# Patient Record
Sex: FEMALE | Race: WHITE | NOT HISPANIC OR LATINO | Employment: OTHER | URBAN - METROPOLITAN AREA
[De-identification: names, ages, dates, MRNs, and addresses within clinical notes are randomized per-mention and may not be internally consistent; named-entity substitution may affect disease eponyms.]

---

## 2019-07-27 ENCOUNTER — HOSPITAL ENCOUNTER (EMERGENCY)
Facility: HOSPITAL | Age: 39
Discharge: HOME | End: 2019-07-27
Attending: EMERGENCY MEDICINE
Payer: MEDICAID

## 2019-07-27 VITALS
HEIGHT: 69 IN | BODY MASS INDEX: 22.96 KG/M2 | OXYGEN SATURATION: 99 % | DIASTOLIC BLOOD PRESSURE: 70 MMHG | SYSTOLIC BLOOD PRESSURE: 126 MMHG | WEIGHT: 155 LBS | RESPIRATION RATE: 16 BRPM | TEMPERATURE: 98 F | HEART RATE: 77 BPM

## 2019-07-27 DIAGNOSIS — H57.89 EYE IRRITATION: Primary | ICD-10-CM

## 2019-07-27 PROCEDURE — 99283 EMERGENCY DEPT VISIT LOW MDM: CPT | Mod: ,,, | Performed by: EMERGENCY MEDICINE

## 2019-07-27 PROCEDURE — 99283 EMERGENCY DEPT VISIT LOW MDM: CPT

## 2019-07-27 PROCEDURE — 25000003 PHARM REV CODE 250: Performed by: EMERGENCY MEDICINE

## 2019-07-27 PROCEDURE — 99283 PR EMERGENCY DEPT VISIT,LEVEL III: ICD-10-PCS | Mod: ,,, | Performed by: EMERGENCY MEDICINE

## 2019-07-27 RX ORDER — CITALOPRAM 40 MG/1
40 TABLET, FILM COATED ORAL DAILY
COMMUNITY
End: 2021-06-02 | Stop reason: CLARIF

## 2019-07-27 RX ORDER — PROPARACAINE HYDROCHLORIDE 5 MG/ML
2 SOLUTION/ DROPS OPHTHALMIC
Status: COMPLETED | OUTPATIENT
Start: 2019-07-27 | End: 2019-07-27

## 2019-07-27 RX ORDER — ERYTHROMYCIN 5 MG/G
OINTMENT OPHTHALMIC
Status: COMPLETED | OUTPATIENT
Start: 2019-07-27 | End: 2019-07-27

## 2019-07-27 RX ADMIN — FLUORESCEIN SODIUM 1 EACH: 1 STRIP OPHTHALMIC at 02:07

## 2019-07-27 RX ADMIN — ERYTHROMYCIN 1 INCH: 5 OINTMENT OPHTHALMIC at 03:07

## 2019-07-27 RX ADMIN — PROPARACAINE HYDROCHLORIDE 2 DROP: 5 SOLUTION/ DROPS OPHTHALMIC at 02:07

## 2019-07-27 NOTE — ED TRIAGE NOTES
"Suzette Fagan, a 39 y.o. female presents to the ED w/ complaint of eye irritation to left eye after foreign object was in eye. PT stated she has blurry vision in left eye and feels like something is still in the eye after flushing it. PT denies chest pain, SOB, fever, chills, and n/v/d.     Triage note:  Chief Complaint   Patient presents with    Eye Problem     Pt states "It feels like something is in my eye, when I blink it rubs". Pt reports left eye irritation X1 day, tried to flush the eye with no relief. -vision change.      Review of patient's allergies indicates:  No Known Allergies  Past Medical History:   Diagnosis Date    Anxiety     Depression      Adult Physical Assessment  LOC: Suzette Fagan, 39 y.o. female verified via two identifiers.  The patient is awake, alert, oriented and speaking appropriately at this time.  APPEARANCE: Patient resting comfortably and appears to be in no acute distress at this time. Patient is clean and well groomed, patient's clothing is properly fastened.  SKIN:The skin is warm and dry, color consistent with ethnicity, patient has normal skin turgor and moist mucus membranes, skin intact, no breakdown or brusing noted.  MUSCULOSKELETAL: Patient moving all extremities well, no obvious swelling or deformities noted.  RESPIRATORY: Airway is open and patent, respirations are spontaneous, patient has a normal effort and rate, no accessory muscle use noted.  CARDIAC: Patient has a normal rate and rhythm, no periphreal edema noted in any extremity, capillary refill < 3 seconds in all extremities  ABDOMEN: Soft and non tender to palpation, no abdominal distention noted. Bowel sounds present in all four quadrants.  NEUROLOGIC: Eyes open spontaneously, behavior appropriate to situation, follows commands, facial expression symmetrical, bilateral hand grasp equal and even, purposeful motor response noted, normal sensation in all extremities when touched with a finger.    "

## 2019-07-27 NOTE — DISCHARGE INSTRUCTIONS
Use ointment 2-3 times per day to keep eye moist. Can also use artificial tears   Follow up with eye doctor on Monday if symptoms are not resolved

## 2019-07-27 NOTE — ED PROVIDER NOTES
"Encounter Date: 7/27/2019    SCRIBE #1 NOTE: I, Paulo Davidson, am scribing for, and in the presence of,  Oma Zapata MD. I have scribed the following portions of the note - Other sections scribed: HPI, ROS, PE, MDM.       History     Chief Complaint   Patient presents with    Eye Problem     Pt states "It feels like something is in my eye, when I blink it rubs". Pt reports left eye irritation X1 day, tried to flush the eye with no relief. -vision change.      Time seen by provider: 2:38 AM    This is a 39 y.o. female who presents with complaint of left eye foreign body sensation. Patient states she was in the trunk of her car at 11 AM yesterday when she suddenly felt some eye irritation. Patient has tried flushing out her eye with water but continues to have foreign body sensation in her eye. Patient wears glasses only and does not use contact lenses. She feels that her vision is decreased in the left eye.         Review of patient's allergies indicates:  No Known Allergies  Past Medical History:   Diagnosis Date    Anxiety     Depression      History reviewed. No pertinent surgical history.  History reviewed. No pertinent family history.  Social History     Tobacco Use    Smoking status: Current Every Day Smoker     Packs/day: 1.00     Years: 20.00     Pack years: 20.00    Smokeless tobacco: Never Used   Substance Use Topics    Alcohol use: Not Currently    Drug use: Never     Review of Systems   Constitutional: Negative for fatigue and fever.   Eyes: Positive for pain and visual disturbance.   All other systems reviewed and are negative.      Physical Exam     Initial Vitals [07/27/19 0107]   BP Pulse Resp Temp SpO2   126/70 77 16 97.6 °F (36.4 °C) 99 %      MAP       --         Physical Exam    Nursing note and vitals reviewed.  Constitutional: She appears well-developed and well-nourished. No distress.   HENT:   Head: Normocephalic and atraumatic.   Eyes: EOM are normal. Pupils are equal, round, and " reactive to light.   No foreign body. Minimal scleral injection. No fluorescein uptake.    Cardiovascular: Normal rate.   Pulmonary/Chest: No respiratory distress.   Musculoskeletal: Normal range of motion.   Neurological: She is oriented to person, place, and time. GCS score is 15. GCS eye subscore is 4. GCS verbal subscore is 5. GCS motor subscore is 6.         ED Course   Procedures  Labs Reviewed - No data to display       Imaging Results    None          Medical Decision Making:   History:   Old Medical Records: I decided to obtain old medical records.  Initial Assessment:   Evaluation of left eye pain, consistent with foreign body sensation. No obvious foreign body seen. Relief with proparacaine drop. No fluorescein uptake noted. Will treat with erythromycin for comfort. Follow up with eye doctor on Monday if symptoms have not improved.             Scribe Attestation:   Scribe #1: I performed the above scribed service and the documentation accurately describes the services I performed. I attest to the accuracy of the note.               Clinical Impression:       ICD-10-CM ICD-9-CM   1. Eye irritation H57.89 379.99                                Oma Zapata MD  07/27/19 0626

## 2021-03-05 ENCOUNTER — HOSPITAL ENCOUNTER (EMERGENCY)
Facility: HOSPITAL | Age: 41
Discharge: HOME | End: 2021-03-06
Attending: EMERGENCY MEDICINE
Payer: MEDICAID

## 2021-03-05 VITALS
SYSTOLIC BLOOD PRESSURE: 131 MMHG | TEMPERATURE: 98 F | BODY MASS INDEX: 27.85 KG/M2 | HEART RATE: 83 BPM | OXYGEN SATURATION: 97 % | DIASTOLIC BLOOD PRESSURE: 68 MMHG | HEIGHT: 69 IN | WEIGHT: 188 LBS | RESPIRATION RATE: 16 BRPM

## 2021-03-05 DIAGNOSIS — M25.571 ACUTE RIGHT ANKLE PAIN: ICD-10-CM

## 2021-03-05 DIAGNOSIS — M79.661 PAIN AND SWELLING OF RIGHT LOWER LEG: ICD-10-CM

## 2021-03-05 DIAGNOSIS — M79.89 PAIN AND SWELLING OF RIGHT LOWER LEG: ICD-10-CM

## 2021-03-05 LAB
ALBUMIN SERPL BCP-MCNC: 4.1 G/DL (ref 3.5–5.2)
ALP SERPL-CCNC: 99 U/L (ref 55–135)
ALT SERPL W/O P-5'-P-CCNC: 15 U/L (ref 10–44)
ANION GAP SERPL CALC-SCNC: 10 MMOL/L (ref 8–16)
AST SERPL-CCNC: 18 U/L (ref 10–40)
B-HCG UR QL: NEGATIVE
BASOPHILS # BLD AUTO: 0.02 K/UL (ref 0–0.2)
BASOPHILS NFR BLD: 0.3 % (ref 0–1.9)
BILIRUB SERPL-MCNC: 0.3 MG/DL (ref 0.1–1)
BUN SERPL-MCNC: 10 MG/DL (ref 6–20)
CALCIUM SERPL-MCNC: 8.8 MG/DL (ref 8.7–10.5)
CHLORIDE SERPL-SCNC: 109 MMOL/L (ref 95–110)
CO2 SERPL-SCNC: 25 MMOL/L (ref 23–29)
CREAT SERPL-MCNC: 0.9 MG/DL (ref 0.5–1.4)
CTP QC/QA: YES
DIFFERENTIAL METHOD: ABNORMAL
EOSINOPHIL # BLD AUTO: 0.2 K/UL (ref 0–0.5)
EOSINOPHIL NFR BLD: 2.8 % (ref 0–8)
ERYTHROCYTE [DISTWIDTH] IN BLOOD BY AUTOMATED COUNT: 12.8 % (ref 11.5–14.5)
EST. GFR  (AFRICAN AMERICAN): >60 ML/MIN/1.73 M^2
EST. GFR  (NON AFRICAN AMERICAN): >60 ML/MIN/1.73 M^2
GLUCOSE SERPL-MCNC: 96 MG/DL (ref 70–110)
HCT VFR BLD AUTO: 40.3 % (ref 37–48.5)
HGB BLD-MCNC: 13.5 G/DL (ref 12–16)
IMM GRANULOCYTES # BLD AUTO: 0.01 K/UL (ref 0–0.04)
IMM GRANULOCYTES NFR BLD AUTO: 0.1 % (ref 0–0.5)
LYMPHOCYTES # BLD AUTO: 2.2 K/UL (ref 1–4.8)
LYMPHOCYTES NFR BLD: 30.5 % (ref 18–48)
MCH RBC QN AUTO: 33.3 PG (ref 27–31)
MCHC RBC AUTO-ENTMCNC: 33.5 G/DL (ref 32–36)
MCV RBC AUTO: 99 FL (ref 82–98)
MONOCYTES # BLD AUTO: 0.5 K/UL (ref 0.3–1)
MONOCYTES NFR BLD: 6.7 % (ref 4–15)
NEUTROPHILS # BLD AUTO: 4.3 K/UL (ref 1.8–7.7)
NEUTROPHILS NFR BLD: 59.6 % (ref 38–73)
NRBC BLD-RTO: 0 /100 WBC
PLATELET # BLD AUTO: 194 K/UL (ref 150–350)
PMV BLD AUTO: 11.3 FL (ref 9.2–12.9)
POTASSIUM SERPL-SCNC: 4.3 MMOL/L (ref 3.5–5.1)
PROT SERPL-MCNC: 7.3 G/DL (ref 6–8.4)
RBC # BLD AUTO: 4.06 M/UL (ref 4–5.4)
SODIUM SERPL-SCNC: 144 MMOL/L (ref 136–145)
URATE SERPL-MCNC: 3.5 MG/DL (ref 2.4–5.7)
WBC # BLD AUTO: 7.15 K/UL (ref 3.9–12.7)

## 2021-03-05 PROCEDURE — 81025 URINE PREGNANCY TEST: CPT | Performed by: EMERGENCY MEDICINE

## 2021-03-05 PROCEDURE — 80053 COMPREHEN METABOLIC PANEL: CPT | Performed by: EMERGENCY MEDICINE

## 2021-03-05 PROCEDURE — 36415 COLL VENOUS BLD VENIPUNCTURE: CPT | Performed by: EMERGENCY MEDICINE

## 2021-03-05 PROCEDURE — 96375 TX/PRO/DX INJ NEW DRUG ADDON: CPT

## 2021-03-05 PROCEDURE — 63600175 PHARM REV CODE 636 W HCPCS: Performed by: EMERGENCY MEDICINE

## 2021-03-05 PROCEDURE — 96374 THER/PROPH/DIAG INJ IV PUSH: CPT

## 2021-03-05 PROCEDURE — 84550 ASSAY OF BLOOD/URIC ACID: CPT | Performed by: EMERGENCY MEDICINE

## 2021-03-05 PROCEDURE — 99284 EMERGENCY DEPT VISIT MOD MDM: CPT | Mod: 25

## 2021-03-05 PROCEDURE — 85025 COMPLETE CBC W/AUTO DIFF WBC: CPT | Performed by: EMERGENCY MEDICINE

## 2021-03-05 RX ORDER — COLCHICINE 0.6 MG/1
0.6 TABLET ORAL 2 TIMES DAILY
Qty: 12 TABLET | Refills: 0 | Status: SHIPPED | OUTPATIENT
Start: 2021-03-05 | End: 2021-06-02 | Stop reason: CLARIF

## 2021-03-05 RX ORDER — INDOMETHACIN 50 MG/1
50 CAPSULE ORAL
Qty: 15 CAPSULE | Refills: 0 | Status: SHIPPED | OUTPATIENT
Start: 2021-03-05 | End: 2021-03-10

## 2021-03-05 RX ORDER — PREDNISONE 20 MG/1
40 TABLET ORAL DAILY
Qty: 8 TABLET | Refills: 0 | Status: SHIPPED | OUTPATIENT
Start: 2021-03-05 | End: 2021-03-09

## 2021-03-05 RX ORDER — KETOROLAC TROMETHAMINE 30 MG/ML
10 INJECTION, SOLUTION INTRAMUSCULAR; INTRAVENOUS
Status: COMPLETED | OUTPATIENT
Start: 2021-03-05 | End: 2021-03-05

## 2021-03-05 RX ORDER — METHYLPREDNISOLONE SOD SUCC 125 MG
125 VIAL (EA) INJECTION
Status: COMPLETED | OUTPATIENT
Start: 2021-03-05 | End: 2021-03-05

## 2021-03-05 RX ORDER — MORPHINE SULFATE 2 MG/ML
6 INJECTION, SOLUTION INTRAMUSCULAR; INTRAVENOUS
Status: COMPLETED | OUTPATIENT
Start: 2021-03-05 | End: 2021-03-05

## 2021-03-05 RX ORDER — HYDROCODONE BITARTRATE AND ACETAMINOPHEN 5; 325 MG/1; MG/1
1 TABLET ORAL EVERY 6 HOURS PRN
Qty: 8 TABLET | Refills: 0 | Status: SHIPPED | OUTPATIENT
Start: 2021-03-05 | End: 2021-03-07

## 2021-03-05 RX ADMIN — MORPHINE SULFATE 6 MG: 2 INJECTION, SOLUTION INTRAMUSCULAR; INTRAVENOUS at 10:03

## 2021-03-05 RX ADMIN — METHYLPREDNISOLONE SODIUM SUCCINATE 125 MG: 125 INJECTION, POWDER, FOR SOLUTION INTRAMUSCULAR; INTRAVENOUS at 10:03

## 2021-03-05 RX ADMIN — KETOROLAC TROMETHAMINE 10 MG: 30 INJECTION, SOLUTION INTRAMUSCULAR at 10:03

## 2021-04-26 ENCOUNTER — PATIENT MESSAGE (OUTPATIENT)
Dept: RESEARCH | Facility: HOSPITAL | Age: 41
End: 2021-04-26

## 2021-06-01 ENCOUNTER — OFFICE VISIT (OUTPATIENT)
Dept: SURGERY | Facility: CLINIC | Age: 41
End: 2021-06-01
Payer: MEDICAID

## 2021-06-01 VITALS
BODY MASS INDEX: 27.75 KG/M2 | WEIGHT: 187.38 LBS | OXYGEN SATURATION: 97 % | SYSTOLIC BLOOD PRESSURE: 120 MMHG | RESPIRATION RATE: 18 BRPM | HEIGHT: 69 IN | HEART RATE: 79 BPM | DIASTOLIC BLOOD PRESSURE: 77 MMHG

## 2021-06-01 DIAGNOSIS — Z01.818 PRE-OP TESTING: Primary | ICD-10-CM

## 2021-06-01 DIAGNOSIS — R22.2 MASS OF SUBCUTANEOUS TISSUE OF BACK: ICD-10-CM

## 2021-06-01 PROCEDURE — 99999 PR PBB SHADOW E&M-EST. PATIENT-LVL V: ICD-10-PCS | Mod: PBBFAC,,, | Performed by: SURGERY

## 2021-06-01 PROCEDURE — 99999 PR PBB SHADOW E&M-EST. PATIENT-LVL V: CPT | Mod: PBBFAC,,, | Performed by: SURGERY

## 2021-06-01 PROCEDURE — 99203 PR OFFICE/OUTPT VISIT, NEW, LEVL III, 30-44 MIN: ICD-10-PCS | Mod: S$PBB,,, | Performed by: SURGERY

## 2021-06-01 PROCEDURE — 99215 OFFICE O/P EST HI 40 MIN: CPT | Mod: PBBFAC,PN | Performed by: SURGERY

## 2021-06-01 PROCEDURE — 99203 OFFICE O/P NEW LOW 30 MIN: CPT | Mod: S$PBB,,, | Performed by: SURGERY

## 2021-06-01 RX ORDER — CEFAZOLIN SODIUM 2 G/50ML
2 SOLUTION INTRAVENOUS
Status: CANCELLED | OUTPATIENT
Start: 2021-06-01

## 2021-06-07 PROBLEM — R22.2 MASS OF SUBCUTANEOUS TISSUE OF BACK: Status: ACTIVE | Noted: 2021-06-07

## 2021-06-21 ENCOUNTER — OFFICE VISIT (OUTPATIENT)
Dept: SURGERY | Facility: CLINIC | Age: 41
End: 2021-06-21
Payer: MEDICAID

## 2021-06-21 VITALS
SYSTOLIC BLOOD PRESSURE: 109 MMHG | BODY MASS INDEX: 27.75 KG/M2 | OXYGEN SATURATION: 97 % | WEIGHT: 187.38 LBS | HEIGHT: 69 IN | DIASTOLIC BLOOD PRESSURE: 74 MMHG | RESPIRATION RATE: 18 BRPM | HEART RATE: 71 BPM

## 2021-06-21 DIAGNOSIS — Z98.890 POST-OPERATIVE STATE: Primary | ICD-10-CM

## 2021-06-21 PROCEDURE — 99999 PR PBB SHADOW E&M-EST. PATIENT-LVL III: ICD-10-PCS | Mod: PBBFAC,,, | Performed by: SURGERY

## 2021-06-21 PROCEDURE — 99213 OFFICE O/P EST LOW 20 MIN: CPT | Mod: PBBFAC,PN | Performed by: SURGERY

## 2021-06-21 PROCEDURE — 99024 POSTOP FOLLOW-UP VISIT: CPT | Mod: ,,, | Performed by: SURGERY

## 2021-06-21 PROCEDURE — 99024 PR POST-OP FOLLOW-UP VISIT: ICD-10-PCS | Mod: ,,, | Performed by: SURGERY

## 2021-06-21 PROCEDURE — 99999 PR PBB SHADOW E&M-EST. PATIENT-LVL III: CPT | Mod: PBBFAC,,, | Performed by: SURGERY

## 2021-10-07 ENCOUNTER — OFFICE VISIT (OUTPATIENT)
Dept: SURGERY | Facility: CLINIC | Age: 41
End: 2021-10-07
Payer: MEDICAID

## 2021-10-07 VITALS
RESPIRATION RATE: 18 BRPM | OXYGEN SATURATION: 96 % | DIASTOLIC BLOOD PRESSURE: 67 MMHG | WEIGHT: 191.81 LBS | HEIGHT: 69 IN | SYSTOLIC BLOOD PRESSURE: 111 MMHG | HEART RATE: 78 BPM | BODY MASS INDEX: 28.41 KG/M2

## 2021-10-07 DIAGNOSIS — K43.9 VENTRAL HERNIA WITHOUT OBSTRUCTION OR GANGRENE: ICD-10-CM

## 2021-10-07 PROCEDURE — 99214 PR OFFICE/OUTPT VISIT, EST, LEVL IV, 30-39 MIN: ICD-10-PCS | Mod: S$PBB,,, | Performed by: SURGERY

## 2021-10-07 PROCEDURE — 99999 PR PBB SHADOW E&M-EST. PATIENT-LVL III: CPT | Mod: PBBFAC,,, | Performed by: SURGERY

## 2021-10-07 PROCEDURE — 99213 OFFICE O/P EST LOW 20 MIN: CPT | Mod: PBBFAC,PN | Performed by: SURGERY

## 2021-10-07 PROCEDURE — 99999 PR PBB SHADOW E&M-EST. PATIENT-LVL III: ICD-10-PCS | Mod: PBBFAC,,, | Performed by: SURGERY

## 2021-10-07 PROCEDURE — 99214 OFFICE O/P EST MOD 30 MIN: CPT | Mod: S$PBB,,, | Performed by: SURGERY

## 2021-10-14 ENCOUNTER — OFFICE VISIT (OUTPATIENT)
Dept: SURGERY | Facility: CLINIC | Age: 41
End: 2021-10-14
Payer: MEDICAID

## 2021-10-14 VITALS
HEIGHT: 69 IN | WEIGHT: 191.81 LBS | BODY MASS INDEX: 28.41 KG/M2 | SYSTOLIC BLOOD PRESSURE: 121 MMHG | OXYGEN SATURATION: 97 % | HEART RATE: 78 BPM | RESPIRATION RATE: 18 BRPM | DIASTOLIC BLOOD PRESSURE: 67 MMHG

## 2021-10-14 DIAGNOSIS — Z98.890 POST-OPERATIVE STATE: Primary | ICD-10-CM

## 2021-10-14 DIAGNOSIS — M62.08 DIASTASIS RECTI: ICD-10-CM

## 2021-10-14 PROCEDURE — 99024 POSTOP FOLLOW-UP VISIT: CPT | Mod: ,,, | Performed by: SURGERY

## 2021-10-14 PROCEDURE — 99024 PR POST-OP FOLLOW-UP VISIT: ICD-10-PCS | Mod: ,,, | Performed by: SURGERY

## 2021-10-14 PROCEDURE — 99213 OFFICE O/P EST LOW 20 MIN: CPT | Mod: PBBFAC,PN | Performed by: SURGERY

## 2021-10-14 PROCEDURE — 99999 PR PBB SHADOW E&M-EST. PATIENT-LVL III: ICD-10-PCS | Mod: PBBFAC,,, | Performed by: SURGERY

## 2021-10-14 PROCEDURE — 99999 PR PBB SHADOW E&M-EST. PATIENT-LVL III: CPT | Mod: PBBFAC,,, | Performed by: SURGERY

## 2024-01-26 ENCOUNTER — HOSPITAL ENCOUNTER (EMERGENCY)
Facility: OTHER | Age: 44
Discharge: HOME | End: 2024-01-26
Attending: EMERGENCY MEDICINE
Payer: MEDICAID

## 2024-01-26 VITALS
SYSTOLIC BLOOD PRESSURE: 135 MMHG | TEMPERATURE: 98 F | WEIGHT: 190 LBS | BODY MASS INDEX: 28.14 KG/M2 | HEART RATE: 76 BPM | RESPIRATION RATE: 18 BRPM | DIASTOLIC BLOOD PRESSURE: 68 MMHG | OXYGEN SATURATION: 99 % | HEIGHT: 69 IN
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
## 2024-01-26 DIAGNOSIS — M25.511 RIGHT SHOULDER PAIN: ICD-10-CM

## 2024-01-26 DIAGNOSIS — G44.319 ACUTE POST-TRAUMATIC HEADACHE, NOT INTRACTABLE: ICD-10-CM

## 2024-01-26 LAB
B-HCG UR QL: NEGATIVE
CTP QC/QA: YES

## 2024-01-26 PROCEDURE — 81025 URINE PREGNANCY TEST: CPT | Performed by: PHYSICIAN ASSISTANT

## 2024-01-26 PROCEDURE — 25000003 PHARM REV CODE 250: Performed by: NURSE PRACTITIONER

## 2024-01-26 PROCEDURE — 99285 EMERGENCY DEPT VISIT HI MDM: CPT | Mod: 25

## 2024-01-26 RX ORDER — ONDANSETRON 4 MG/1
4 TABLET, ORALLY DISINTEGRATING ORAL
Status: COMPLETED | OUTPATIENT
Start: 2024-01-26 | End: 2024-01-26

## 2024-01-26 RX ORDER — METHOCARBAMOL 500 MG/1
1000 TABLET, FILM COATED ORAL 3 TIMES DAILY
Qty: 30 TABLET | Refills: 0 | Status: SHIPPED | OUTPATIENT
Start: 2024-01-26 | End: 2024-02-27

## 2024-01-26 RX ORDER — LIDOCAINE 50 MG/G
1 PATCH TOPICAL DAILY
Qty: 15 PATCH | Refills: 0 | Status: SHIPPED | OUTPATIENT
Start: 2024-01-26

## 2024-01-26 RX ORDER — KETOROLAC TROMETHAMINE 10 MG/1
10 TABLET, FILM COATED ORAL EVERY 6 HOURS PRN
Qty: 15 TABLET | Refills: 0 | Status: SHIPPED | OUTPATIENT
Start: 2024-01-26

## 2024-01-26 RX ORDER — LIDOCAINE 50 MG/G
2 PATCH TOPICAL ONCE
Status: DISCONTINUED | OUTPATIENT
Start: 2024-01-26 | End: 2024-01-26 | Stop reason: HOSPADM

## 2024-01-26 RX ORDER — KETOROLAC TROMETHAMINE 10 MG/1
10 TABLET, FILM COATED ORAL
Status: COMPLETED | OUTPATIENT
Start: 2024-01-26 | End: 2024-01-26

## 2024-01-26 RX ORDER — BUTALBITAL, ACETAMINOPHEN AND CAFFEINE 50; 325; 40 MG/1; MG/1; MG/1
TABLET ORAL
Qty: 20 TABLET | Refills: 0 | Status: SHIPPED | OUTPATIENT
Start: 2024-01-26 | End: 2024-02-27

## 2024-01-26 RX ORDER — METHOCARBAMOL 500 MG/1
1000 TABLET, FILM COATED ORAL
Status: COMPLETED | OUTPATIENT
Start: 2024-01-26 | End: 2024-01-26

## 2024-01-26 RX ORDER — BUTALBITAL, ACETAMINOPHEN AND CAFFEINE 50; 325; 40 MG/1; MG/1; MG/1
2 TABLET ORAL
Status: COMPLETED | OUTPATIENT
Start: 2024-01-26 | End: 2024-01-26

## 2024-01-26 RX ORDER — PROCHLORPERAZINE MALEATE 10 MG
10 TABLET ORAL EVERY 6 HOURS PRN
Qty: 20 TABLET | Refills: 0 | Status: SHIPPED | OUTPATIENT
Start: 2024-01-26 | End: 2024-02-27

## 2024-01-26 RX ADMIN — KETOROLAC TROMETHAMINE 10 MG: 10 TABLET, FILM COATED ORAL at 03:01

## 2024-01-26 RX ADMIN — ONDANSETRON 4 MG: 4 TABLET, ORALLY DISINTEGRATING ORAL at 03:01

## 2024-01-26 RX ADMIN — LIDOCAINE 5% 2 PATCH: 700 PATCH TOPICAL at 03:01

## 2024-01-26 RX ADMIN — BUTALBITAL, ACETAMINOPHEN, AND CAFFEINE 2 TABLET: 50; 325; 40 TABLET ORAL at 03:01

## 2024-01-26 RX ADMIN — METHOCARBAMOL 1000 MG: 500 TABLET ORAL at 03:01
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
## 2024-02-26 ENCOUNTER — TELEPHONE (OUTPATIENT)
Dept: NEUROLOGY | Facility: CLINIC | Age: 44
End: 2024-02-26
Payer: MEDICAID

## 2024-02-26 NOTE — TELEPHONE ENCOUNTER
Spoke to Pt and confirmed tomorrow's appt. Pt was advised to arrive early and informed about the 15 min randa period.

## 2024-02-27 ENCOUNTER — OFFICE VISIT (OUTPATIENT)
Dept: NEUROLOGY | Facility: CLINIC | Age: 44
End: 2024-02-27
Payer: MEDICAID

## 2024-02-27 VITALS — SYSTOLIC BLOOD PRESSURE: 122 MMHG | HEART RATE: 73 BPM | DIASTOLIC BLOOD PRESSURE: 76 MMHG

## 2024-02-27 DIAGNOSIS — M54.2 CERVICALGIA OF OCCIPITO-ATLANTO-AXIAL REGION: ICD-10-CM

## 2024-02-27 DIAGNOSIS — M54.81 OCCIPITAL NEURITIS: ICD-10-CM

## 2024-02-27 DIAGNOSIS — G44.86 CERVICOGENIC HEADACHE: ICD-10-CM

## 2024-02-27 DIAGNOSIS — R41.89 COGNITIVE CHANGE: ICD-10-CM

## 2024-02-27 DIAGNOSIS — S13.4XXA WHIPLASH INJURY TO NECK, INITIAL ENCOUNTER: ICD-10-CM

## 2024-02-27 DIAGNOSIS — S06.0XAS CONCUSSION WITH UNKNOWN LOSS OF CONSCIOUSNESS STATUS, SEQUELA: Primary | ICD-10-CM

## 2024-02-27 PROBLEM — S06.0XAA CONCUSSION WITH UNKNOWN LOSS OF CONSCIOUSNESS STATUS: Status: ACTIVE | Noted: 2024-02-27

## 2024-02-27 PROCEDURE — 99214 OFFICE O/P EST MOD 30 MIN: CPT | Mod: PBBFAC | Performed by: PSYCHIATRY & NEUROLOGY

## 2024-02-27 PROCEDURE — 99204 OFFICE O/P NEW MOD 45 MIN: CPT | Mod: S$PBB,,, | Performed by: PSYCHIATRY & NEUROLOGY

## 2024-02-27 PROCEDURE — 99999 PR PBB SHADOW E&M-EST. PATIENT-LVL IV: CPT | Mod: PBBFAC,,, | Performed by: PSYCHIATRY & NEUROLOGY

## 2024-02-27 PROCEDURE — 3078F DIAST BP <80 MM HG: CPT | Mod: CPTII,,, | Performed by: PSYCHIATRY & NEUROLOGY

## 2024-02-27 PROCEDURE — 1160F RVW MEDS BY RX/DR IN RCRD: CPT | Mod: CPTII,,, | Performed by: PSYCHIATRY & NEUROLOGY

## 2024-02-27 PROCEDURE — 1159F MED LIST DOCD IN RCRD: CPT | Mod: CPTII,,, | Performed by: PSYCHIATRY & NEUROLOGY

## 2024-02-27 PROCEDURE — 3074F SYST BP LT 130 MM HG: CPT | Mod: CPTII,,, | Performed by: PSYCHIATRY & NEUROLOGY

## 2024-02-27 RX ORDER — METHYLPREDNISOLONE 4 MG/1
TABLET ORAL
Qty: 1 EACH | Refills: 0 | Status: SHIPPED | OUTPATIENT
Start: 2024-02-27 | End: 2024-04-18
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
## 2024-02-27 NOTE — PATIENT INSTRUCTIONS
Medrol dose pack prescribed. Take as instructed on package insert.  The patient was instructed to ice the occipital region for no more than 20 minutes at least once a day but may repeat this as many times as they would like.  Discussed ergonomic accommodations for occipital neuritis/neuralgia. Mainly perform all work at eye level to minimize continued neck flexion which will aggravate the nerve.  Patient was encouraged to do daily light cardio exercise but instructed to limit physical activities to walking, walking in water up to the waist only or riding a stationary bike, recumbent preferred. No weight lifting in upper body, no neck massage, no acupuncture of neck, and no dry needling of upper neck. No neck PT unless otherwise stated. If neck PT is recommended, the therapist may do joint manipulation at this time but no suboccipital soft tissue therapy. Any of your therapists may also do passive neck range of motion activities. No chiropractor work on neck. Lower body strengthening with resistant bands, leg machines, and strapping weights to legs okay. No core body workouts. No running or use of cardio equipment other than stationary bike. No swimming or body surfing. No amusement park rides. No lifting more than 5-10 lbs and bend at the knees, not the waist.  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 To be referred for consultation for occipital nerve release procedure if initially clinically responsive to injections but always with a return of symptoms.  Referral for ST for cognition
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
## 2024-03-06 ENCOUNTER — CLINICAL SUPPORT (OUTPATIENT)
Dept: REHABILITATION | Facility: HOSPITAL | Age: 44
End: 2024-03-06
Attending: PSYCHIATRY & NEUROLOGY
Payer: MEDICAID

## 2024-03-06 DIAGNOSIS — S06.0XAS CONCUSSION WITH UNKNOWN LOSS OF CONSCIOUSNESS STATUS, SEQUELA: Primary | ICD-10-CM

## 2024-03-06 DIAGNOSIS — R41.89 COGNITIVE CHANGE: ICD-10-CM

## 2024-03-06 DIAGNOSIS — R41.841 COGNITIVE COMMUNICATION DEFICIT: ICD-10-CM

## 2024-03-06 PROCEDURE — 96125 COGNITIVE TEST BY HC PRO: CPT | Mod: PN
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
## 2024-03-11 NOTE — PROGRESS NOTES
[facility]  Speech Therapy Treatment Note- Neurological Rehabilitation  Date: 3/12/2024     Name: Suzette Santoyo   MRN: [id number]   Therapy Diagnosis:   Encounter Diagnosis   Name Primary?    Cognitive communication deficit Yes   Physician: Edwin Palencia MD  Physician Orders: Ambulatory Referral to Speech Therapy   Medical Diagnosis: Concussion with unknown loss of consciousness status, sequela [S06.0XAS], Cognitive change [R41.89]     Visit #/ Visits Authorized: 1/ 16  Date of Evaluation:  3/6/2024   Plan of Care Certification:    3/6/2024 to 5/3/2024   Insurance Authorization Period: 3/12/2024 - 7/10/2024   Plan of Care Expiration Date:  5/3/2024  Extended Plan of Care:  n/a   Progress Note: 4/10/2024     Time In:  9:20  Time Out:  10:05  Total Billable Time: 45     Precautions: Standard  Subjective:   Patient reports: patient pleasant and shared no headaches as of late.   She was compliant to home exercise program.   Response to previous treatment: good  Pain Scale: no pain indicated throughout session  Objective:   TIMED  Procedure Min.   Cognitive Therapeutic Interventions, first 15 minutes CPT 36910  15   Cognitive Therapeutic Interventions, each additional 15 minutes CPT 67186  30     Short Term Goals: (4 weeks) Current Progress:   1. Patient will complete selective attention tasks (auditory or visual) with 90% accuracy independently increase selective attention.     Progressing/ Not Met 3/6/2024   Symbol cancellation with 4 stimuli in field of 350. Patient with 90% accuracy independently.    Met x1   2. Patient will sustain attention to complete moderate to complex reasoning tasks for 2 minutes with one request for clarification to increase sustained attention.     Progressing/ Not Met 3/6/2024   See goals 3. No difficulties given verbal distraction   3. Patient will use attention shifting strategies to shift attention between two tasks with no more than 3 cues or 90% accuracy to  improve alternating attention.      Progressing/ Not Met 3/6/2024   Alternating symbols- L4- 96% independently.   4. Patient will complete short term recall tasks after a 2-3 minutes delay with 90% accuracy  independently  with use of memory strategies to improve recall of information and generalization of memory strategies.     Progressing/ Not Met 3/6/2024   ST educated patient on memory strategies patient shares use of write it and picture it mostly.        Met x1   5. Patient will complete mental manipulation tasks with 90% acc to improve working memory.     Progressing/ Not Met 3/6/2024   Established this date    6.Patient will demonstrate awareness of cognitive-communication difficulties in 1-2 situations in her day in which cognitive deficits could or did compromise efficiency and performance with minimal cueing.      Progressing/ Not Met 3/6/2024   Patient able to state that light has been a situation that brings on headaches and she has been working through this problem by explaining to her son that he cannot have all the lights on. ST suggested to patient to place lighting behind her.  Patient verbalized understanding.     Met x1   7. Patient will independently implement cognitive/sensory rest periods throughout day after identifying cognitive fatigue including limiting sensory input (sound, light, etc.)      Progressing/ Not Met 3/6/2024   ST educated patient on importance of rest periods following activities to reduce concussion symptoms from occurring. Patient verbalized understanding.    Met x1          Long Term Goals: (10 weeks) Current Progress:   1. Patient will improve  attention skills to effectively attend to and communicate in simple daily living tasks in functional living environment.     Established this date     2. Patient will demonstrate use of  self-monitoring during daily living activities to improve safety and awareness in functional living environment.    Established this date     3.  "Patient will use appropriate memory strategies to schedule and recall weekly activities, express needs and recall names to maintain safety and participate socially in functional living environment.     Established this date         Patient Education/Response:   Patient educated regarding the followin. Goals set  2. Memory strategies  3. Levels of attention  4. Cognitive rest periods  5. Light sensitivity and how it relates to processing    Home program established: yes-see above  Patient verbalized understanding to all above education provided.     See Electronic Medical Record under Patient Instructions for exercises provided throughout therapy.  Assessment:   Suzette is progressing well towards her goals. Patient was pleasant and completed alternating and sustained attention task with adequate completion. Patient shared understanding of all education. She voiced, "I use write it and picture it mostly to help with memory." She shares continued reduced processing. ST suggested reducing distractions to better focus, in hopes of speeding up processing. Patient verbalized understanding. Current goals remain appropriate. Goals to be updated as necessary.     Patient prognosis is Good. Patient will continue to benefit from skilled outpatient speech and language therapy to address the deficits listed in the problem list on initial evaluation, provide patient/family education and to maximize patient's level of independence in the home and community environment.   Medical necessity is demonstrated by the following IMPAIRMENTS:  Cognition: Deficits in executive functioning and memory prevent the pt from returning to work, and place her at risk of unsafe behavior and a decline in quality of life.    Barriers to Therapy: none  Patient's spiritual, cultural and educational needs considered and patient agreeable to plan of care and goals.  Plan:   Continue Plan of Care with focus on rehabilitation and compensation for " cognitive communication deficits    Corinne Lafleur CCC-SLP   3/12/2024

## 2024-03-12 ENCOUNTER — CLINICAL SUPPORT (OUTPATIENT)
Dept: REHABILITATION | Facility: HOSPITAL | Age: 44
End: 2024-03-12
Payer: MEDICAID

## 2024-03-12 DIAGNOSIS — R41.841 COGNITIVE COMMUNICATION DEFICIT: Primary | ICD-10-CM

## 2024-03-12 PROCEDURE — 97129 THER IVNTJ 1ST 15 MIN: CPT | Mod: PN

## 2024-03-12 PROCEDURE — 97130 THER IVNTJ EA ADDL 15 MIN: CPT | Mod: PN

## 2024-03-19 NOTE — PROGRESS NOTES
[facility]  Speech Therapy Treatment Note- Neurological Rehabilitation  Date: 3/25/2024     Name: Suzette Santoyo   MRN: [id number]   Therapy Diagnosis:   Encounter Diagnosis   Name Primary?    Cognitive communication deficit Yes   Physician: Edwin Palencia MD  Physician Orders: Ambulatory Referral to Speech Therapy   Medical Diagnosis: Concussion with unknown loss of consciousness status, sequela [S06.0XAS], Cognitive change [R41.89]     Visit #/ Visits Authorized: 2/ 16  Date of Evaluation:  3/6/2024   Plan of Care Certification:    3/6/2024 to 5/3/2024   Insurance Authorization Period: 3/12/2024 - 7/10/2024   Plan of Care Expiration Date:  5/3/2024  Extended Plan of Care:  n/a   Progress Note: 4/10/2024     Time In:  10:05  Time Out:  10:50  Total Billable Time: 45     Precautions: Standard  Subjective:   Patient reports: patient pleasant and has resumed babysitting 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  She was compliant to home exercise program.   Response to previous treatment: good  Pain Scale: no pain indicated throughout session  Objective:   TIMED  Procedure Min.   Cognitive Therapeutic Interventions, first 15 minutes CPT 67618  15   Cognitive Therapeutic Interventions, each additional 15 minutes CPT 29505  30     Short Term Goals: (4 weeks) Current Progress:   1. Patient will complete selective attention tasks (auditory or visual) with 90% accuracy independently increase selective attention.     Progressing/ Not Met 3/6/2024   Not addressed in today's session.      Met x1   2. Patient will sustain attention to complete moderate to complex reasoning tasks for 2 minutes with one request for clarification to increase sustained attention.     Progressing/ Not Met 3/6/2024   Patient completed understanding stories L3- 100% accuracy independently     Met x2   3. Patient will use attention shifting strategies to shift attention between two tasks with no more than 3 cues or 90% accuracy to improve alternating  "attention.      Progressing/ Not Met 3/6/2024   Unscrambling words- 100% accuracy independently. Patient required additional time to process information.    Met x2   4. Patient will complete short term recall tasks after a 2-3 minutes delay with 90% accuracy  independently  with use of memory strategies to improve recall of information and generalization of memory strategies.     Progressing/ Not Met 3/6/2024   ST educated patient on memory strategies patient shares use of write it and picture it mostly.        Met x1   5. Patient will complete mental manipulation tasks with 90% acc to improve working memory.     Progressing/ Not Met 3/6/2024   Unscrambling words- 100% accuracy independently. Patient required additional time to process information.    Met x1   6.Patient will demonstrate awareness of cognitive-communication difficulties in 1-2 situations in her day in which cognitive deficits could or did compromise efficiency and performance with minimal cueing.      Progressing/ Not Met 3/6/2024   Patient reports no difficulties in situation that prohibit her from completing task. Patient states, "I am still aware of taking my time and not trying to do a million things at the heat of the day."    Met x2   7. Patient will independently implement cognitive/sensory rest periods throughout day after identifying cognitive fatigue including limiting sensory input (sound, light, etc.)      Progressing/ Not Met 3/6/2024   Patient states, "I am still aware of taking my time and not trying to do a million things at the heat of the day."    Met x2         Long Term Goals: (10 weeks) Current Progress:   1. Patient will improve  attention skills to effectively attend to and communicate in simple daily living tasks in functional living environment.     Established this date     2. Patient will demonstrate use of  self-monitoring during daily living activities to improve safety and awareness in functional living environment.    " "Established this date     3. Patient will use appropriate memory strategies to schedule and recall weekly activities, express needs and recall names to maintain safety and participate socially in functional living environment.     Established this date         Patient Education/Response:   Patient educated regarding the followin. Goals set  2. Memory strategies  3. Levels of attention  4. Cognitive rest periods  5. Light sensitivity and how it relates to processing    Home program established: yes-see above  Patient verbalized understanding to all above education provided.     See Electronic Medical Record under Patient Instructions for exercises provided throughout therapy.  Assessment:   Progress note- Suzette is progressing well towards her goals. Patient seen with great improvement in her reasoning and problem solving abilities. As well as attention and memory recall. ST and patient discussed ability to process information and metacognitive strategy to ask for others to repeat to ensure understanding. Patient verbalized understanding. Patient shared improvements in conversations as she had a to discipline son and demonstrated great reasoning and problem solving skills. ST and patient discussed additional appointments and to reassess progress as she believes she is getting "a lot better." Goals to be updated as necessary.     Patient prognosis is Good. Patient will continue to benefit from skilled outpatient speech and language therapy to address the deficits listed in the problem list on initial evaluation, provide patient/family education and to maximize patient's level of independence in the home and community environment.   Medical necessity is demonstrated by the following IMPAIRMENTS:  Cognition: Deficits in executive functioning and memory prevent the pt from returning to work, and place her at risk of unsafe behavior and a decline in quality of life.    Barriers to Therapy: none  Patient's spiritual, " cultural and educational needs considered and patient agreeable to plan of care and goals.  Plan:   Continue Plan of Care with focus on rehabilitation and compensation for cognitive communication deficits    Corinne Lafleur CCC-SLP   3/25/2024

## 2024-03-25 ENCOUNTER — CLINICAL SUPPORT (OUTPATIENT)
Dept: REHABILITATION | Facility: HOSPITAL | Age: 44
End: 2024-03-25
Payer: MEDICAID

## 2024-03-25 DIAGNOSIS — R41.841 COGNITIVE COMMUNICATION DEFICIT: Primary | ICD-10-CM

## 2024-03-25 PROCEDURE — 97129 THER IVNTJ 1ST 15 MIN: CPT | Mod: PN

## 2024-03-25 PROCEDURE — 97130 THER IVNTJ EA ADDL 15 MIN: CPT | Mod: PN

## 2024-03-27 NOTE — PROGRESS NOTES
[facility]  Speech Therapy Treatment Note- Neurological Rehabilitation  Date: 4/1/2024     Name: Suzette Santoyo   MRN: [id number]   Therapy Diagnosis:   Encounter Diagnosis   Name Primary?    Cognitive communication deficit Yes   Physician: Edwin Palencia MD  Physician Orders: Ambulatory Referral to Speech Therapy   Medical Diagnosis: Concussion with unknown loss of consciousness status, sequela [S06.0XAS], Cognitive change [R41.89]     Visit #/ Visits Authorized: 3/ 16  Date of Evaluation:  3/6/2024   Plan of Care Certification:    3/6/2024 to 5/3/2024   Insurance Authorization Period: 3/12/2024 - 7/10/2024   Plan of Care Expiration Date:  5/3/2024  Extended Plan of Care:  n/a   Progress Note: 4/10/2024     Time In:  10:05  Time Out:  10:50  Total Billable Time: 45     Precautions: Standard  Subjective:   Patient reports: patient pleasant and shared enjoyment of spending time with family for Easter Sunday.  She was compliant to home exercise program.   Response to previous treatment: good  Pain Scale: no pain indicated throughout session  Objective:   TIMED  Procedure Min.   Cognitive Therapeutic Interventions, first 15 minutes CPT 64079  15   Cognitive Therapeutic Interventions, each additional 15 minutes CPT 01067  30     Short Term Goals: (4 weeks) Current Progress:   1. Patient will complete selective attention tasks (auditory or visual) with 90% accuracy independently increase selective attention.     Progressing/ Not Met 3/6/2024   Patient seen with great attention across tasks      Met x2   2. Patient will sustain attention to complete moderate to complex reasoning tasks for 2 minutes with one request for clarification to increase sustained attention.     Progressing/ Not Met 3/6/2024   Patient able to sustain attention to complete logic puzzle independently.   86% accuracy independently  She required extended time to figure out puzzle. She was able to point out mistakes given  minimal A.    Met x2   3. Patient will use attention shifting strategies to shift attention between two tasks with no more than 3 cues or 90% accuracy to improve alternating attention.      Progressing/ Not Met 3/6/2024   Alternating words level 3- 100% accuracy independently.    Met x2   4. Patient will complete short term recall tasks after a 2-3 minutes delay with 90% accuracy  independently  with use of memory strategies to improve recall of information and generalization of memory strategies.     Progressing/ Not Met 3/6/2024   Not addressed in today's session.          Met x1   5. Patient will complete mental manipulation tasks with 90% acc to improve working memory.     Progressing/ Not Met 3/6/2024   Patient completed alternating words and was able to independently manipulate words and alphabet in her head with out difficulties     Met x1   6.Patient will demonstrate awareness of cognitive-communication difficulties in 1-2 situations in her day in which cognitive deficits could or did compromise efficiency and performance with minimal cueing.      Progressing/ Not Met 3/6/2024   Patient reports no difficulties in situation that prohibit her from completing task.       Goal Met 4/1/2024      7. Patient will independently implement cognitive/sensory rest periods throughout day after identifying cognitive fatigue including limiting sensory input (sound, light, etc.)      Progressing/ Not Met 3/6/2024   Patient reports adequate awareness and knows when to take a break before fatigue settles in.    Goal Met 4/1/2024            Long Term Goals: (10 weeks) Current Progress:   1. Patient will improve  attention skills to effectively attend to and communicate in simple daily living tasks in functional living environment.     Established this date     2. Patient will demonstrate use of  self-monitoring during daily living activities to improve safety and awareness in functional living environment.    Established this  date     3. Patient will use appropriate memory strategies to schedule and recall weekly activities, express needs and recall names to maintain safety and participate socially in functional living environment.     Established this date         Patient Education/Response:   Patient educated regarding the followin. Processing time  2. Improved problem solving abilities    Home program established: yes-see above  Patient verbalized understanding to all above education provided.     See Electronic Medical Record under Patient Instructions for exercises provided throughout therapy.  Assessment:   Suzette is progressing well towards her goals. Patient seen with great competition of logic puzzle. She required extensive time to process. She seen with great attention in alternating task given verbal distraction. ST and patient discussed progress as she believes she believes she is approaching her cognitive baseline. ST to discharge in upcoming visits. Goals to be updated as necessary.     Patient prognosis is Good. Patient will continue to benefit from skilled outpatient speech and language therapy to address the deficits listed in the problem list on initial evaluation, provide patient/family education and to maximize patient's level of independence in the home and community environment.   Medical necessity is demonstrated by the following IMPAIRMENTS:  Cognition: Deficits in executive functioning and memory prevent the pt from returning to work, and place her at risk of unsafe behavior and a decline in quality of life.    Barriers to Therapy: none  Patient's spiritual, cultural and educational needs considered and patient agreeable to plan of care and goals.  Plan:   Continue Plan of Care with focus on rehabilitation and compensation for cognitive communication deficits    Corinne Lafleur CCC-SLP   2024

## 2024-04-01 ENCOUNTER — CLINICAL SUPPORT (OUTPATIENT)
Dept: REHABILITATION | Facility: HOSPITAL | Age: 44
End: 2024-04-01
Attending: PSYCHIATRY & NEUROLOGY
Payer: MEDICAID

## 2024-04-01 DIAGNOSIS — R41.841 COGNITIVE COMMUNICATION DEFICIT: Primary | ICD-10-CM

## 2024-04-01 PROCEDURE — 97129 THER IVNTJ 1ST 15 MIN: CPT | Mod: PN

## 2024-04-01 PROCEDURE — 97130 THER IVNTJ EA ADDL 15 MIN: CPT | Mod: PN

## 2024-04-03 NOTE — PROGRESS NOTES
[facility]  Speech Therapy Treatment Note- Neurological Rehabilitation  Date: 4/8/2024     Name: Suzette Santoyo   MRN: [id number]   Therapy Diagnosis:   Encounter Diagnosis   Name Primary?    Cognitive communication deficit Yes   Physician: Edwin Palencia MD  Physician Orders: Ambulatory Referral to Speech Therapy   Medical Diagnosis: Concussion with unknown loss of consciousness status, sequela [S06.0XAS], Cognitive change [R41.89]     Visit #/ Visits Authorized: 4/ 16  Date of Evaluation:  3/6/2024   Plan of Care Certification:    3/6/2024 to 5/3/2024   Insurance Authorization Period: 3/12/2024 - 7/10/2024   Plan of Care Expiration Date:  5/3/2024  Extended Plan of Care:  n/a   Progress Note: 4/10/2024     Time In:  10:05  Time Out:  10:50  Total Billable Time: 45     Precautions: Standard  Subjective:   Patient reports: patient pleasant.  She was compliant to home exercise program.   Response to previous treatment: good  Pain Scale: no pain indicated throughout session  Objective:   TIMED  Procedure Min.   Cognitive Therapeutic Interventions, first 15 minutes CPT 11209  15   Cognitive Therapeutic Interventions, each additional 15 minutes CPT 22387  30     Short Term Goals: (4 weeks) Current Progress:   1. Patient will complete selective attention tasks (auditory or visual) with 90% accuracy independently increase selective attention.     Progressing/ Not Met 3/6/2024   Patient completed alternating symbols L4- 100% accuracy given minimal distraction.    Goal Met 4/8/2024      2. Patient will sustain attention to complete moderate to complex reasoning tasks for 2 minutes with one request for clarification to increase sustained attention.     Progressing/ Not Met 3/6/2024   Patient completed alternating words L3- 100% accuracy independently.    Goal Met 4/8/2024      3. Patient will use attention shifting strategies to shift attention between two tasks with no more than 3 cues or 90%  accuracy to improve alternating attention.      Progressing/ Not Met 3/6/2024   Patient completed alternating words L3- 100% accuracy independently.    Goal Met 4/8/2024      4. Patient will complete short term recall tasks after a 2-3 minutes delay with 90% accuracy  independently  with use of memory strategies to improve recall of information and generalization of memory strategies.     Progressing/ Not Met 3/6/2024   Patient recalled 5 words following 8 minute delay with 100% independently          Met x2   5. Patient will complete mental manipulation tasks with 90% acc to improve working memory.     Progressing/ Not Met 3/6/2024   Patient completed alternating words and was able to independently manipulate words and alphabet in her head with out difficulties     Met x2   6.Patient will demonstrate awareness of cognitive-communication difficulties in 1-2 situations in her day in which cognitive deficits could or did compromise efficiency and performance with minimal cueing.      Progressing/ Not Met 3/6/2024   Patient reports no difficulties in situation that prohibit her from completing task.       Goal Met 4/8/2024      7. Patient will independently implement cognitive/sensory rest periods throughout day after identifying cognitive fatigue including limiting sensory input (sound, light, etc.)      Progressing/ Not Met 3/6/2024   Patient reports adequate awareness and knows when to take a break before fatigue settles in.    Goal Met 4/8/2024            Long Term Goals: (10 weeks) Current Progress:   1. Patient will improve  attention skills to effectively attend to and communicate in simple daily living tasks in functional living environment.     Established this date     2. Patient will demonstrate use of  self-monitoring during daily living activities to improve safety and awareness in functional living environment.    Established this date     3. Patient will use appropriate memory strategies to schedule and  recall weekly activities, express needs and recall names to maintain safety and participate socially in functional living environment.     Established this date         Patient Education/Response:   Patient educated regarding the followin. Processing time  2. Improved problem solving abilities    Home program established: yes-see above  Patient verbalized understanding to all above education provided.     See Electronic Medical Record under Patient Instructions for exercises provided throughout therapy.  Assessment:   Suzette is progressing well towards her goals. Patient seen with improved attention and memory recall. See improvement in tasks. ST and patient discussed progress as she believes she believes she is approaching her cognitive baseline. Patient has resumed working and has great ability to problem solving skills (seen with disciplining son). ST to discharge on 2024.     Patient prognosis is Good. Patient will continue to benefit from skilled outpatient speech and language therapy to address the deficits listed in the problem list on initial evaluation, provide patient/family education and to maximize patient's level of independence in the home and community environment.   Medical necessity is demonstrated by the following IMPAIRMENTS:  Cognition: Deficits in executive functioning and memory prevent the pt from returning to work, and place her at risk of unsafe behavior and a decline in quality of life.    Barriers to Therapy: none  Patient's spiritual, cultural and educational needs considered and patient agreeable to plan of care and goals.  Plan:   Continue Plan of Care with focus on rehabilitation and compensation for cognitive communication deficits    Corinne Lafleur CCC-SLP   2024

## 2024-04-08 ENCOUNTER — CLINICAL SUPPORT (OUTPATIENT)
Dept: REHABILITATION | Facility: HOSPITAL | Age: 44
End: 2024-04-08
Payer: MEDICAID

## 2024-04-08 DIAGNOSIS — R41.841 COGNITIVE COMMUNICATION DEFICIT: Primary | ICD-10-CM

## 2024-04-08 PROCEDURE — 97129 THER IVNTJ 1ST 15 MIN: CPT | Mod: PN

## 2024-04-08 PROCEDURE — 97130 THER IVNTJ EA ADDL 15 MIN: CPT | Mod: PN

## 2024-04-18 ENCOUNTER — OFFICE VISIT (OUTPATIENT)
Dept: NEUROLOGY | Facility: CLINIC | Age: 44
End: 2024-04-18
Payer: MEDICAID

## 2024-04-18 VITALS — HEART RATE: 76 BPM | SYSTOLIC BLOOD PRESSURE: 109 MMHG | DIASTOLIC BLOOD PRESSURE: 68 MMHG

## 2024-04-18 DIAGNOSIS — S13.4XXD WHIPLASH INJURY TO NECK, SUBSEQUENT ENCOUNTER: ICD-10-CM

## 2024-04-18 DIAGNOSIS — G44.86 CERVICOGENIC HEADACHE: ICD-10-CM

## 2024-04-18 DIAGNOSIS — R41.89 COGNITIVE CHANGE: ICD-10-CM

## 2024-04-18 DIAGNOSIS — M54.2 CERVICALGIA OF OCCIPITO-ATLANTO-AXIAL REGION: ICD-10-CM

## 2024-04-18 DIAGNOSIS — R53.83 FATIGUE DUE TO SLEEP PATTERN DISTURBANCE: ICD-10-CM

## 2024-04-18 DIAGNOSIS — G47.9 FATIGUE DUE TO SLEEP PATTERN DISTURBANCE: ICD-10-CM

## 2024-04-18 DIAGNOSIS — M54.12 CERVICAL RADICULOPATHY: ICD-10-CM

## 2024-04-18 DIAGNOSIS — S06.0XAS CONCUSSION WITH UNKNOWN LOSS OF CONSCIOUSNESS STATUS, SEQUELA: Primary | ICD-10-CM

## 2024-04-18 PROCEDURE — 99999 PR PBB SHADOW E&M-EST. PATIENT-LVL III: CPT | Mod: PBBFAC,,, | Performed by: PSYCHIATRY & NEUROLOGY

## 2024-04-18 PROCEDURE — 99213 OFFICE O/P EST LOW 20 MIN: CPT | Mod: PBBFAC | Performed by: PSYCHIATRY & NEUROLOGY

## 2024-04-18 PROCEDURE — 99214 OFFICE O/P EST MOD 30 MIN: CPT | Mod: S$PBB,,, | Performed by: PSYCHIATRY & NEUROLOGY

## 2024-04-18 PROCEDURE — 1160F RVW MEDS BY RX/DR IN RCRD: CPT | Mod: CPTII,,, | Performed by: PSYCHIATRY & NEUROLOGY

## 2024-04-18 PROCEDURE — 1159F MED LIST DOCD IN RCRD: CPT | Mod: CPTII,,, | Performed by: PSYCHIATRY & NEUROLOGY

## 2024-04-18 RX ORDER — TIZANIDINE 4 MG/1
4 TABLET ORAL NIGHTLY PRN
Qty: 30 TABLET | Refills: 5 | Status: SHIPPED | OUTPATIENT
Start: 2024-04-18
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
## 2024-04-18 NOTE — PATIENT INSTRUCTIONS
Referral for lower neck/upper back to mid back PT with myofascial release (therapist may choose from and do a combination of: deep tissue massage, cupping, dry needling, etc). No soft tissue manipulation of suboccipital region if you start to feel worse. All joint manipulation is fine.  The patient was instructed to ice the occipital region for no more than 20 minutes at least once a day but may repeat this as many times as they would like.  Discussed ergonomic accommodations for occipital neuritis/neuralgia. Mainly perform all work at eye level to minimize continued neck flexion which will aggravate the nerve.  Patient was encouraged to do daily light cardio exercise but instructed to limit physical activities to walking, walking in water up to the waist only or riding a stationary bike, recumbent preferred. No weight lifting in upper body, no neck massage, no acupuncture of neck, and no dry needling of upper neck. No neck PT unless otherwise stated. If neck PT is recommended, the therapist may do joint manipulation at this time but no suboccipital soft tissue therapy. Any of your therapists may also do passive neck range of motion activities. No chiropractor work on neck. Lower body strengthening with resistant bands, leg machines, and strapping weights to legs okay. No core body workouts. No running or use of cardio equipment other than stationary bike. No swimming or body surfing. No amusement park rides. No lifting more than 5-10 lbs and bend at the knees, not the waist.  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 To be referred for consultation for occipital nerve release procedure if initially clinically responsive to injections but always with a return of symptoms.  Continue ST for cognition  Start tizanidine 4 mg nightly. Take 30 minutes before bed. Never drink  alcohol or drive after taking this medication  MRI Cervical spine without contrast

## 2024-05-02 ENCOUNTER — CLINICAL SUPPORT (OUTPATIENT)
Dept: REHABILITATION | Facility: HOSPITAL | Age: 44
End: 2024-05-02
Attending: PSYCHIATRY & NEUROLOGY
Payer: MEDICAID

## 2024-05-02 DIAGNOSIS — R41.841 COGNITIVE COMMUNICATION DEFICIT: Primary | ICD-10-CM

## 2024-05-02 PROCEDURE — 97129 THER IVNTJ 1ST 15 MIN: CPT | Mod: PN

## 2024-05-02 PROCEDURE — 97130 THER IVNTJ EA ADDL 15 MIN: CPT | Mod: PN

## 2024-05-02 NOTE — PLAN OF CARE
Outpatient Therapy Discharge Summary   Discharge Date: 5/2/2024   Name: Suzette Santoyo  Clinic Number: [id number]  Therapy Diagnosis:   Encounter Diagnosis   Name Primary?    Cognitive communication deficit Yes     Physician: Edwin Palencia MD  Physician Orders: Ambulatory Referral to Speech Therapy   Medical Diagnosis: Concussion with unknown loss of consciousness status, sequela [S06.0XAS], Cognitive change [R41.89]      Evaluation Date: 3/6/2024    Date of Last visit: 5/2/2024  Total Visits Received: 5  Cancelled Visits: 0  No Show Visits: 0    Assessment    Assessment of Current Status: Suzette has met all of her goals. Patient seen with improved attention and memory recall. See improvement across tasks. ST and patient discussed progress as she believes she believes she is back to her cognitive baseline. Patient has resumed working and has great ability to problem solving skills (seen deductive reasoning and alternating attention tasks). Patient is being discharged from outpatient speech therapy.      Goals:   Short Term Goals: (4 weeks) Current Progress:   1. Patient will complete selective attention tasks (auditory or visual) with 90% accuracy independently increase selective attention.     Progressing/ Not Met 3/6/2024   Patient completed alternating symbols L4- 100% accuracy given minimal distraction.     Goal Met 5/2/2024       2. Patient will sustain attention to complete moderate to complex reasoning tasks for 2 minutes with one request for clarification to increase sustained attention.     Progressing/ Not Met 3/6/2024   Patient completed alternating words L3- 100% accuracy independently.     Goal Met 5/2/2024       3. Patient will use attention shifting strategies to shift attention between two tasks with no more than 3 cues or 90% accuracy to improve alternating attention.      Progressing/ Not Met 3/6/2024   Patient completed alternating words L3- 100% accuracy independently.     Goal Met  5/2/2024       4. Patient will complete short term recall tasks after a 2-3 minutes delay with 90% accuracy  independently  with use of memory strategies to improve recall of information and generalization of memory strategies.     Progressing/ Not Met 3/6/2024   Patient recalled 5 words following 10 minute delay with 100% independently              Goal Met 5/2/2024       5. Patient will complete mental manipulation tasks with 90% acc to improve working memory.     Progressing/ Not Met 3/6/2024      Goal Met 5/2/2024       6.Patient will demonstrate awareness of cognitive-communication difficulties in 1-2 situations in her day in which cognitive deficits could or did compromise efficiency and performance with minimal cueing.      Progressing/ Not Met 3/6/2024   Patient reports no difficulties in situation that prohibit her from completing task.         Goal Met 5/2/2024       7. Patient will independently implement cognitive/sensory rest periods throughout day after identifying cognitive fatigue including limiting sensory input (sound, light, etc.)      Progressing/ Not Met 3/6/2024   Patient reports adequate awareness and knows when to take a break before fatigue settles in.     Goal Met 5/2/2024             Long Term Goals: (10 weeks) Current Progress:   1. Patient will improve  attention skills to effectively attend to and communicate in simple daily living tasks in functional living environment.     Goal Met 5/2/2024      2. Patient will demonstrate use of  self-monitoring during daily living activities to improve safety and awareness in functional living environment.    Goal Met 5/2/2024      3. Patient will use appropriate memory strategies to schedule and recall weekly activities, express needs and recall names to maintain safety and participate socially in functional living environment.     Goal Met 5/2/2024          Discharge reason: Patient has met all of his/her goals    Plan   This patient is discharged  from Speech Therapy    Corinne Lafleur CCC-SLP   5/2/2024
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
## 2024-05-06 ENCOUNTER — CLINICAL SUPPORT (OUTPATIENT)
Dept: REHABILITATION | Facility: OTHER | Age: 44
End: 2024-05-06
Attending: PSYCHIATRY & NEUROLOGY
Payer: MEDICAID

## 2024-05-06 DIAGNOSIS — R53.1 DECREASED STRENGTH, ENDURANCE, AND MOBILITY: ICD-10-CM

## 2024-05-06 DIAGNOSIS — M54.2 NECK AND SHOULDER PAIN: Primary | ICD-10-CM

## 2024-05-06 DIAGNOSIS — Z74.09 DECREASED STRENGTH, ENDURANCE, AND MOBILITY: ICD-10-CM

## 2024-05-06 DIAGNOSIS — R68.89 DECREASED STRENGTH, ENDURANCE, AND MOBILITY: ICD-10-CM

## 2024-05-06 DIAGNOSIS — M25.519 NECK AND SHOULDER PAIN: Primary | ICD-10-CM

## 2024-05-06 DIAGNOSIS — M62.89 MUSCLE TIGHTNESS: ICD-10-CM

## 2024-05-06 PROCEDURE — 97163 PT EVAL HIGH COMPLEX 45 MIN: CPT | Mod: PN
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
## 2024-05-10 ENCOUNTER — TELEPHONE (OUTPATIENT)
Dept: NEUROLOGY | Facility: CLINIC | Age: 44
End: 2024-05-10
Payer: MEDICAID

## 2024-05-10 NOTE — TELEPHONE ENCOUNTER
Spoke to Pt about her denied MRI. I let her know she will have do 6 weeks of PT  for her neck in order for her insurance to cover her MRI if the MRI is still necessary after 6 weeks of PT. Pt acknowledged understanding.

## 2024-05-14 ENCOUNTER — CLINICAL SUPPORT (OUTPATIENT)
Dept: REHABILITATION | Facility: OTHER | Age: 44
End: 2024-05-14
Payer: MEDICAID

## 2024-05-14 ENCOUNTER — DOCUMENTATION ONLY (OUTPATIENT)
Dept: REHABILITATION | Facility: OTHER | Age: 44
End: 2024-05-14

## 2024-05-14 DIAGNOSIS — M25.519 NECK AND SHOULDER PAIN: Primary | ICD-10-CM

## 2024-05-14 DIAGNOSIS — M62.89 MUSCLE TIGHTNESS: ICD-10-CM

## 2024-05-14 DIAGNOSIS — R53.1 DECREASED STRENGTH, ENDURANCE, AND MOBILITY: ICD-10-CM

## 2024-05-14 DIAGNOSIS — R68.89 DECREASED STRENGTH, ENDURANCE, AND MOBILITY: ICD-10-CM

## 2024-05-14 DIAGNOSIS — Z74.09 DECREASED STRENGTH, ENDURANCE, AND MOBILITY: ICD-10-CM

## 2024-05-14 DIAGNOSIS — M54.2 NECK AND SHOULDER PAIN: Primary | ICD-10-CM

## 2024-05-14 PROCEDURE — 97110 THERAPEUTIC EXERCISES: CPT | Mod: PN,CQ

## 2024-05-14 NOTE — PROGRESS NOTES
Physical Therapist and Physical Therapist Assistant met face to face to discuss patient's treatment plan and progress towards established goals. Pt will be seen by a physical therapist minimally every 6th visit or every 30 days.    Christi Brannon, REY  05/14/2024
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
## 2024-05-24 ENCOUNTER — CLINICAL SUPPORT (OUTPATIENT)
Dept: REHABILITATION | Facility: OTHER | Age: 44
End: 2024-05-24
Payer: MEDICAID

## 2024-05-24 DIAGNOSIS — M62.89 MUSCLE TIGHTNESS: ICD-10-CM

## 2024-05-24 DIAGNOSIS — Z74.09 DECREASED STRENGTH, ENDURANCE, AND MOBILITY: ICD-10-CM

## 2024-05-24 DIAGNOSIS — M54.2 NECK AND SHOULDER PAIN: Primary | ICD-10-CM

## 2024-05-24 DIAGNOSIS — R68.89 DECREASED STRENGTH, ENDURANCE, AND MOBILITY: ICD-10-CM

## 2024-05-24 DIAGNOSIS — R53.1 DECREASED STRENGTH, ENDURANCE, AND MOBILITY: ICD-10-CM

## 2024-05-24 DIAGNOSIS — M25.519 NECK AND SHOULDER PAIN: Primary | ICD-10-CM

## 2024-05-24 PROCEDURE — 97110 THERAPEUTIC EXERCISES: CPT | Mod: PN
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
## 2024-06-04 ENCOUNTER — CLINICAL SUPPORT (OUTPATIENT)
Dept: REHABILITATION | Facility: OTHER | Age: 44
End: 2024-06-04
Payer: MEDICAID

## 2024-06-04 DIAGNOSIS — R68.89 DECREASED STRENGTH, ENDURANCE, AND MOBILITY: ICD-10-CM

## 2024-06-04 DIAGNOSIS — R53.1 DECREASED STRENGTH, ENDURANCE, AND MOBILITY: ICD-10-CM

## 2024-06-04 DIAGNOSIS — Z74.09 DECREASED STRENGTH, ENDURANCE, AND MOBILITY: ICD-10-CM

## 2024-06-04 DIAGNOSIS — M62.89 MUSCLE TIGHTNESS: ICD-10-CM

## 2024-06-04 DIAGNOSIS — M54.2 NECK AND SHOULDER PAIN: Primary | ICD-10-CM

## 2024-06-04 DIAGNOSIS — M25.519 NECK AND SHOULDER PAIN: Primary | ICD-10-CM

## 2024-06-04 PROCEDURE — 97110 THERAPEUTIC EXERCISES: CPT | Mod: PN
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
## 2024-06-07 ENCOUNTER — TELEPHONE (OUTPATIENT)
Dept: NEUROLOGY | Facility: CLINIC | Age: 44
End: 2024-06-07
Payer: MEDICAID

## 2024-06-07 NOTE — TELEPHONE ENCOUNTER
Called Pt to confirm she wants her MRI referral sent to that fax number.  I was unable to speak with her but left a vm.     ----- Message -----  From: Maggy Kamara  Sent: 6/4/2024  10:53 AM CDT  To: Talha Pineda Staff    PATIENTCALL     Pt is calling to speak with someone in provider office regarding she needs to get the office to fax her order for the MRI to her  so, she can get it done. The office fax number [phone] is asking for a return call please call.

## 2024-06-07 NOTE — TELEPHONE ENCOUNTER
Spoke to Pt. Pt stated since her insurance will not cover the MRI that her  will have it taken care. I will fax the MRI referral to [phone] per Pt's request.

## 2024-06-11 ENCOUNTER — CLINICAL SUPPORT (OUTPATIENT)
Dept: REHABILITATION | Facility: OTHER | Age: 44
End: 2024-06-11
Payer: MEDICAID

## 2024-06-11 DIAGNOSIS — R68.89 DECREASED STRENGTH, ENDURANCE, AND MOBILITY: ICD-10-CM

## 2024-06-11 DIAGNOSIS — M25.519 NECK AND SHOULDER PAIN: Primary | ICD-10-CM

## 2024-06-11 DIAGNOSIS — M62.89 MUSCLE TIGHTNESS: ICD-10-CM

## 2024-06-11 DIAGNOSIS — R53.1 DECREASED STRENGTH, ENDURANCE, AND MOBILITY: ICD-10-CM

## 2024-06-11 DIAGNOSIS — Z74.09 DECREASED STRENGTH, ENDURANCE, AND MOBILITY: ICD-10-CM

## 2024-06-11 DIAGNOSIS — M54.2 NECK AND SHOULDER PAIN: Primary | ICD-10-CM

## 2024-06-11 PROCEDURE — 97110 THERAPEUTIC EXERCISES: CPT | Mod: PN,CQ
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
## 2024-06-13 ENCOUNTER — OFFICE VISIT (OUTPATIENT)
Dept: NEUROLOGY | Facility: CLINIC | Age: 44
End: 2024-06-13
Payer: MEDICAID

## 2024-06-13 VITALS — DIASTOLIC BLOOD PRESSURE: 88 MMHG | HEART RATE: 87 BPM | SYSTOLIC BLOOD PRESSURE: 131 MMHG

## 2024-06-13 DIAGNOSIS — S06.0X9S CONCUSSION WITH LOSS OF CONSCIOUSNESS, SEQUELA: Primary | ICD-10-CM

## 2024-06-13 DIAGNOSIS — S13.4XXD WHIPLASH INJURY TO NECK, SUBSEQUENT ENCOUNTER: ICD-10-CM

## 2024-06-13 DIAGNOSIS — G44.86 CERVICOGENIC HEADACHE: ICD-10-CM

## 2024-06-13 DIAGNOSIS — M54.2 CERVICALGIA OF OCCIPITO-ATLANTO-AXIAL REGION: ICD-10-CM

## 2024-06-13 PROBLEM — S06.0X9A CONCUSSION WITH LOSS OF CONSCIOUSNESS: Status: ACTIVE | Noted: 2024-06-13

## 2024-06-13 PROCEDURE — 99213 OFFICE O/P EST LOW 20 MIN: CPT | Mod: PBBFAC | Performed by: PSYCHIATRY & NEUROLOGY

## 2024-06-13 PROCEDURE — 99999 PR PBB SHADOW E&M-EST. PATIENT-LVL III: CPT | Mod: PBBFAC,,, | Performed by: PSYCHIATRY & NEUROLOGY

## 2024-06-13 NOTE — PATIENT INSTRUCTIONS
Continue lower neck/upper back to mid back PT with myofascial release (therapist may choose from and do a combination of: deep tissue massage, cupping, dry needling, etc). No soft tissue manipulation of suboccipital region if you start to feel worse. All joint manipulation is fine.  The patient was instructed to ice the occipital region for no more than 20 minutes at least once a day but may repeat this as many times as they would like.  Discussed ergonomic accommodations for occipital neuritis/neuralgia. Mainly perform all work at eye level to minimize continued neck flexion which will aggravate the nerve.  Patient was encouraged to do daily light cardio exercise but instructed to limit physical activities to walking, walking in water up to the waist only or riding a stationary bike, recumbent preferred. No weight lifting in upper body, no neck massage, no acupuncture of neck, and no dry needling of upper neck. No neck PT unless otherwise stated. If neck PT is recommended, the therapist may do joint manipulation at this time but no suboccipital soft tissue therapy. Any of your therapists may also do passive neck range of motion activities. No chiropractor work on neck. Lower body strengthening with resistant bands, leg machines, and strapping weights to legs okay. No core body workouts. No running or use of cardio equipment other than stationary bike. No swimming or body surfing. No amusement park rides. No lifting more than 5-10 lbs and bend at the knees, not the waist.  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 To be referred for consultation for occipital nerve release procedure if initially clinically responsive to injections but always with a return of symptoms.  Continue ST exercises  Continue tizanidine 4 mg nightly. Take 30 minutes before bed. Never drink  alcohol or drive after taking this medication  Please get a copy of MRI C-Spine on a CD with a copy of report
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
## 2024-08-22 ENCOUNTER — TELEPHONE (OUTPATIENT)
Dept: NEUROLOGY | Facility: CLINIC | Age: 44
End: 2024-08-22
Payer: MEDICAID

## 2024-08-22 NOTE — TELEPHONE ENCOUNTER
Spoke to Pt about rescheduling her appt. Pt is rescheduled for August 19.     ----- Message from Tracy Hernandez sent at 8/22/2024 10:50 AM CDT -----  Name of Who is calling :  JOSE SULLIVAN [[id number]]      What is the request in detail:  Pt would like to know if she can still be seen today if possible because she now has a ride. Please assist       Can the clinic reply by [facility]:no            [facility]:[phone]

## 2024-08-28 ENCOUNTER — TELEPHONE (OUTPATIENT)
Dept: NEUROLOGY | Facility: CLINIC | Age: 44
End: 2024-08-28
Payer: MEDICAID

## 2024-08-28 NOTE — TELEPHONE ENCOUNTER
Spoke to Pt and confirmed tomorrow's appt. Pt was advised to arrive early and informed about the 10 min randa period.

## 2024-08-29 ENCOUNTER — OFFICE VISIT (OUTPATIENT)
Dept: NEUROLOGY | Facility: CLINIC | Age: 44
End: 2024-08-29
Payer: MEDICAID

## 2024-08-29 VITALS — SYSTOLIC BLOOD PRESSURE: 115 MMHG | DIASTOLIC BLOOD PRESSURE: 71 MMHG | HEART RATE: 77 BPM

## 2024-08-29 DIAGNOSIS — S06.0X9S CONCUSSION WITH LOSS OF CONSCIOUSNESS, SEQUELA: Primary | ICD-10-CM

## 2024-08-29 DIAGNOSIS — S13.4XXD WHIPLASH INJURY TO NECK, SUBSEQUENT ENCOUNTER: ICD-10-CM

## 2024-08-29 DIAGNOSIS — M25.511 CHRONIC RIGHT SHOULDER PAIN: ICD-10-CM

## 2024-08-29 DIAGNOSIS — G44.86 CERVICOGENIC HEADACHE: ICD-10-CM

## 2024-08-29 DIAGNOSIS — M54.2 CERVICALGIA OF OCCIPITO-ATLANTO-AXIAL REGION: ICD-10-CM

## 2024-08-29 DIAGNOSIS — G89.29 CHRONIC RIGHT SHOULDER PAIN: ICD-10-CM

## 2024-08-29 PROCEDURE — 99999 PR PBB SHADOW E&M-EST. PATIENT-LVL III: CPT | Mod: PBBFAC,,, | Performed by: PSYCHIATRY & NEUROLOGY

## 2024-08-29 PROCEDURE — 99213 OFFICE O/P EST LOW 20 MIN: CPT | Mod: PBBFAC | Performed by: PSYCHIATRY & NEUROLOGY

## 2024-08-29 RX ORDER — TIZANIDINE 4 MG/1
4 TABLET ORAL NIGHTLY PRN
Qty: 30 TABLET | Refills: 5 | Status: SHIPPED | OUTPATIENT
Start: 2024-08-29
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
## 2024-08-29 NOTE — PATIENT INSTRUCTIONS
Restart lower neck/upper back to mid back PT with myofascial release (therapist may choose from and do a combination of: deep tissue massage, cupping, dry needling, etc). No soft tissue manipulation of suboccipital region if you start to feel worse. All joint manipulation is fine.  The patient was instructed to ice the occipital region for no more than 20 minutes at least once a day but may repeat this as many times as they would like.  Discussed ergonomic accommodations for occipital neuritis/neuralgia. Mainly perform all work at eye level to minimize continued neck flexion which will aggravate the nerve.  Patient was encouraged to do daily light cardio exercise but instructed to limit physical activities to walking, walking in water up to the waist only or riding a stationary bike, recumbent preferred. No weight lifting in upper body, no neck massage, no acupuncture of neck, and no dry needling of upper neck. No neck PT unless otherwise stated. If neck PT is recommended, the therapist may do joint manipulation at this time but no suboccipital soft tissue therapy. Any of your therapists may also do passive neck range of motion activities. No chiropractor work on neck. Lower body strengthening with resistant bands, leg machines, and strapping weights to legs okay. No core body workouts. No running or use of cardio equipment other than stationary bike. No swimming or body surfing. No amusement park rides. No lifting more than 5-10 lbs and bend at the knees, not the waist.  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 To be referred for consultation for occipital nerve release procedure if initially clinically responsive to injections but always with a return of symptoms.  Continue ST exercises  Continue tizanidine 4 mg nightly. Take 30 minutes before bed. Never drink alcohol  or drive after taking this medication  Referral for ortho for right shoulder

## 2024-09-17 ENCOUNTER — CLINICAL SUPPORT (OUTPATIENT)
Dept: REHABILITATION | Facility: HOSPITAL | Age: 44
End: 2024-09-17
Payer: MEDICAID

## 2024-09-17 DIAGNOSIS — R53.1 DECREASED STRENGTH, ENDURANCE, AND MOBILITY: ICD-10-CM

## 2024-09-17 DIAGNOSIS — Z74.09 DECREASED STRENGTH, ENDURANCE, AND MOBILITY: ICD-10-CM

## 2024-09-17 DIAGNOSIS — M54.2 NECK AND SHOULDER PAIN: Primary | ICD-10-CM

## 2024-09-17 DIAGNOSIS — M25.519 NECK AND SHOULDER PAIN: Primary | ICD-10-CM

## 2024-09-17 DIAGNOSIS — R68.89 DECREASED STRENGTH, ENDURANCE, AND MOBILITY: ICD-10-CM

## 2024-09-17 DIAGNOSIS — M62.89 MUSCLE TIGHTNESS: ICD-10-CM

## 2024-09-17 PROCEDURE — 97140 MANUAL THERAPY 1/> REGIONS: CPT

## 2024-09-17 PROCEDURE — 97161 PT EVAL LOW COMPLEX 20 MIN: CPT
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
## 2024-09-24 ENCOUNTER — CLINICAL SUPPORT (OUTPATIENT)
Dept: REHABILITATION | Facility: HOSPITAL | Age: 44
End: 2024-09-24
Payer: MEDICAID

## 2024-09-24 DIAGNOSIS — M25.519 NECK AND SHOULDER PAIN: Primary | ICD-10-CM

## 2024-09-24 DIAGNOSIS — M54.2 NECK AND SHOULDER PAIN: Primary | ICD-10-CM

## 2024-09-24 PROCEDURE — 97110 THERAPEUTIC EXERCISES: CPT

## 2024-09-24 PROCEDURE — 97140 MANUAL THERAPY 1/> REGIONS: CPT

## 2024-09-26 ENCOUNTER — CLINICAL SUPPORT (OUTPATIENT)
Dept: REHABILITATION | Facility: HOSPITAL | Age: 44
End: 2024-09-26
Payer: MEDICAID

## 2024-09-26 DIAGNOSIS — M25.519 NECK AND SHOULDER PAIN: Primary | ICD-10-CM

## 2024-09-26 DIAGNOSIS — M54.2 NECK AND SHOULDER PAIN: Primary | ICD-10-CM

## 2024-09-26 PROCEDURE — 97140 MANUAL THERAPY 1/> REGIONS: CPT

## 2024-09-26 PROCEDURE — 97110 THERAPEUTIC EXERCISES: CPT

## 2024-10-09 NOTE — PLAN OF CARE
[facility]   Physical Therapy Initial Evaluation      Name: Suzette Santoyo  Clinic Number: [id number]    Therapy Diagnosis:   Encounter Diagnoses   Name Primary?    Neck and shoulder pain Yes    Muscle tightness     Decreased strength, endurance, and mobility         Physician: Edwin Palencia MD    Physician Orders: PT Eval and Treat   Medical Diagnosis from Referral: Whiplash injury to neck, subsequent encounter [S13.4XXD], Cervicalgia of ncjmtihc-agknrhy-wfpcr region [M54.2], Cervicogenic headache [G44.86]   Evaluation Date: 9/17/2024  Authorization Period Expiration: 8/29/2025  Plan of Care Expiration: 11/17/2024  Progress Note Due: 10/17/2024  Date of Surgery: N/a  Visit # / Visits authorized: 1/ 1   FOTO: 1/ 3    Precautions: Standard     Time In: 9:00  Time Out: 10:00  Total Billable Time: 60 minutes    Subjective     Date of onset: Chronic    History of current condition - [place] reports:  Suzette Santoyo is a 44 y.o. female with h/o anxiety, depression, concussion with unknown LOC, kinetic force injury with resultant cranio cervical trauma and occipital neuritis s/p medrol dose pack x1 who presents for follow up. On last visit, patient was continued on lower neck/upper back to mid back PT with myofascial release, ice therapy, ergonomics, and exercise restrictions and ST exercises and continued tizanidine and to get a copy of MRI C-Spine. On 8/1/24, she hit front top of head on a glass door that she was trying to open, not knocked to the ground, denies LOC, denies amnesia, remembers sound of impact, denies superficial injury, denies immediate symptoms but headache developed 5-10 mins later. Headaches are daily, come and go, half the day, occipital to bifrontal, throbbing. She only has neck pain that is right sided when sweeps or cook or has to move RUE a lot and goes from right shoulder to right neck when moving RUE a lot. She denies photophobia, phonophobia,  weakness, numbness, tingling, dizziness, N/V, change in vision. She is sleeping pretty well and wakes up tired sometimes and tizanidine helps and feels good next morning on nights she takes it. Mood is pretty good. She denies side effects to tizanidine. She stopped neck PT due to getting the headaches more frequently but does home exercises. She ices sometimes. She is doing memory exercises.            Falls: No    Imaging: MRI studies    Prior Therapy: Yes  Social History:  lives with their family  Occupation: None  Prior Level of Function: INDP  Current Level of Function: INDP    Pain:  Current 6/10, worst 8/10, best 6/10   Location: bilateral neck  Description: Aching, Dull, and Throbbing  Aggravating Factors: Standing. Looking Down  Easing Factors: rest    Patients goals: To improve my pain     Medical History:   Past Medical History:   Diagnosis Date    Anxiety     Depression        Surgical History:   Suzette Santoyo  has no past surgical history on file.    Medications:   Suzette has a current medication list which includes the following prescription(s): tizanidine.    Allergies:   Review of patient's allergies indicates:  No Known Allergies     Objective        Posture Alignment: forward head, slouched posture    CERVICAL SPINE AROM:   Flexion: WNL   Extension: 50%   Left Sidebend: 50%   Right Sidebend: 50%   Left Rotation: WFL   Right Rotation: 50%     SEGMENTAL MOBILITY: Hypomobile thoughout C4-C7    UPPER EXTREMITY STRENGTH:   Left Right   Shoulder Flexion 4/5 4/5   Shoulder Abduction 4/5 4/5   Shoulder Internal Rotation 4/5 4/5   Shoulder External Rotation 4/5 4/5   Elbow Flexion  4/5 4/5   Elbow Extension 4/5 4/5   Wrist Flexion 4/5 4/5   Wrist Extension 4/5 4/5    WNL WNL         DTR's:   Left Right   Biceps Tendon 2+ 2+   Brachioradialis 2+ 2+   Triceps Tendon 2+ 2+     Dermatomes: Sensation: Light Touch: Intact      FLEXIBILITY: Decreased Upper Trap Flexibility R>L    Special Tests:    Left Right   Compression - -   Dystraction - -   Spurling - -     Pt/family was provided educational information, including: role of PT, goals for PT, scheduling - pt verbalized understanding. Discussed insurance limitations with pt.       Intake Outcome Measure for FOTO Survey    Therapist reviewed FOTO scores for Suzette Santoyo on 9/17/2024.   FOTO report - see Media section or FOTO account episode details.    Intake Score: TBD%         Treatment     Total Treatment time (time-based codes) separate from Evaluation: 20 minutes     Suzette received the treatments listed below:      therapeutic exercises to develop strength, endurance, ROM, flexibility, posture, and core stabilization for 10 minutes including:  Chin Tucks  Cervical Rotation  Seated CTJ Mob  Upper Trap Stretch  Levator Scap Stretch    manual therapy techniques for 10 minutes, including:  Grd IV/V Joint Mobs to CTJ, C4-C5  STM to Upper Trap  Grd III Joint Mobs to T3-T4    Patient Education and Home Exercises     Education provided:   - HEP    Written Home Exercises Provided: yes. Exercises were reviewed and Suzette was able to demonstrate them prior to the end of the session.  Suzette demonstrated good  understanding of the education provided. See EMR under Patient Instructions for exercises provided during therapy sessions.    Assessment     Suzette is a 44 y.o. female referred to outpatient Physical Therapy with a medical diagnosis of  Whiplash injury to neck, subsequent encounter [S13.4XXD], Cervicalgia of laffchym-mjvbpdq-xanlr region [M54.2], Cervicogenic headache [G44.86] .Patient presents with decreased cervical ROM, decreased UE strength, decreased functional mobility, and increased pain. Pt was educated on compliance with HEP and role of PT.    Patient prognosis is Excellent.   Patient will benefit from skilled outpatient Physical Therapy to address the deficits stated above and in the chart below, provide patient /family education,  and to maximize patientt's level of independence.     Plan of care discussed with patient: Yes  Patient's spiritual, cultural and educational needs considered and patient is agreeable to the plan of care and goals as stated below:     Anticipated Barriers for therapy: None    Medical Necessity is demonstrated by the following  History  Co-morbidities and personal factors that may impact the plan of care [x] LOW: no personal factors / co-morbidities  [] MODERATE: 1-2 personal factors / co-morbidities  [] HIGH: 3+ personal factors / co-morbidities    Moderate / High Support Documentation:   Co-morbidities affecting plan of care:     Personal Factors:   no deficits     Examination  Body Structures and Functions, activity limitations and participation restrictions that may impact the plan of care [x] LOW: addressing 1-2 elements  [] MODERATE: 3+ elements  [] HIGH: 4+ elements (please support below)    Moderate / High Support Documentation:      Clinical Presentation [x] LOW: stable  [] MODERATE: Evolving  [] HIGH: Unstable     Decision Making/ Complexity Score: low       Goals:  Short Term Goals (4 Weeks):   1. Pt will be independent with HEP to supplement PT in improving pain free cervical mobility  2. Pt will improve UE MMTs by 1/2 grade in all planes to improve strength for lifting and carrying tasks.  3. Pt will demonstrate improved sitting posture to decrease pain experienced in head and neck.  Long Term Goals (8 Weeks):   1. Pt will improve FOTO to >/=45 to improve perceived limitation with changing and maintaining mobility.  2. Pt will improve cervical AROM to WNL in all planes to improve cervical mobility for driving   3. Pt will improve UE MMTs 1 grade in all planes to improve strength for lifting and carrying tasks.  4. Pt will report no pain with lifting > 5 lbs to promote physical activity.   5. Pt will report no pain with cervical AROM in all planes to promote QOL.  Plan     Plan of care Certification:  9/17/2024 to 11/17/2024.    Outpatient Physical Therapy 2 times weekly for 2 weeks to include the following interventions: Aquatic Therapy, Cervical/Lumbar Traction, Gait Training, Manual Therapy, Moist Heat/ Ice, Neuromuscular Re-ed, Patient Education, Self Care, Therapeutic Activities, Therapeutic Exercise, and Ultrasound.     Anthony Holder, PT        Physician's Signature: _________________________________________ Date: ________________

## 2024-10-09 NOTE — PROGRESS NOTES
[facility]   Physical Therapy Treatment Note      Name: Suzette Santoyo  Clinic Number: [id number]    Therapy Diagnosis: No diagnosis found.  Physician: Edwin Palencia MD    Visit Date: 9/24/2024    Physician Orders: PT Eval and Treat   Medical Diagnosis from Referral: Whiplash injury to neck, subsequent encounter [S13.4XXD], Cervicalgia of easazubl-zazauuf-nxrbs region [M54.2], Cervicogenic headache [G44.86]   Evaluation Date: 9/17/2024  Authorization Period Expiration: 8/29/2025  Plan of Care Expiration: 11/17/2024  Progress Note Due: 10/17/2024  Date of Surgery: N/a  Visit # / Visits authorized: 1/ 1   FOTO: 1/ 3       PTA Visit #: 0/5       Subjective     She was compliant with home exercise program.  Response to previous treatment: Decreased Headache  Functional change: Decreased Headache    Pain: 3/10  Location: bilateral neck      Objective      Objective Measures updated at progress report unless specified.     Treatment     Suzette received the treatments listed below:    Suzette received the treatments listed below:       therapeutic exercises to develop strength, endurance, ROM, flexibility, posture, and core stabilization for 30 minutes including:  Chin Tucks  Cervical Rotation  Seated CTJ Mob  Upper Trap Stretch  Levator Scap Stretch     manual therapy techniques for 30 minutes, including:  Grd IV/V Joint Mobs to CTJ, C4-C5, C1-C2  STM to Upper Trap  Grd III Joint Mobs to T3-T4    Patient Education and Home Exercises       Education provided:   - HEP    Written Home Exercises Provided: Yes. Exercises were reviewed and Suzette was able to demonstrate them prior to the end of the session.  Suzette demonstrated good  understanding of the education provided. See Electronic Medical Record under Patient Instructions for exercises provided during therapy sessions    Assessment     Pt tolerated treatment well and had no complaints of pain. Cont to progress POC as  annie Bradford Is progressing well towards her goals.   Patient prognosis is Excellent.     Patient will continue to benefit from skilled outpatient physical therapy to address the deficits listed in the problem list box on initial evaluation, provide pt/family education and to maximize pt's level of independence in the home and community environment.     Patient's spiritual, cultural and educational needs considered and pt agreeable to plan of care and goals.     Anticipated barriers to physical therapy: None    Goals:   Short Term Goals (4 Weeks):   1. Pt will be independent with HEP to supplement PT in improving pain free cervical mobility  2. Pt will improve UE MMTs by 1/2 grade in all planes to improve strength for lifting and carrying tasks.  3. Pt will demonstrate improved sitting posture to decrease pain experienced in head and neck.  Long Term Goals (8 Weeks):   1. Pt will improve FOTO to >/=45 to improve perceived limitation with changing and maintaining mobility.  2. Pt will improve cervical AROM to WNL in all planes to improve cervical mobility for driving   3. Pt will improve UE MMTs 1 grade in all planes to improve strength for lifting and carrying tasks.  4. Pt will report no pain with lifting > 5 lbs to promote physical activity.   5. Pt will report no pain with cervical AROM in all planes to promote QOL.  Plan      Plan of care Certification: 9/17/2024 to 11/17/2024.     Outpatient Physical Therapy 2 times weekly for 2 weeks to include the following interventions: Aquatic Therapy, Cervical/Lumbar Traction, Gait Training, Manual Therapy, Moist Heat/ Ice, Neuromuscular Re-ed, Patient Education, Self Care, Therapeutic Activities, Therapeutic Exercise, and Ultrasound.      Anthony Holder, PT           Physician's Signature: _________________________________________ Date: ________________

## 2024-10-09 NOTE — PROGRESS NOTES
[facility]   Physical Therapy Treatment Note      Name: Suzette Santoyo  Clinic Number: [id number]    Therapy Diagnosis: No diagnosis found.  Physician: Edwin Palencia MD    Visit Date: 9/26/2024    Physician Orders: PT Eval and Treat   Medical Diagnosis from Referral: Whiplash injury to neck, subsequent encounter [S13.4XXD], Cervicalgia of syhlnhwq-kkvlnba-kaqtx region [M54.2], Cervicogenic headache [G44.86]   Evaluation Date: 9/17/2024  Authorization Period Expiration: 8/29/2025  Plan of Care Expiration: 11/17/2024  Progress Note Due: 10/17/2024  Date of Surgery: N/a  Visit # / Visits authorized: 1/ 1   FOTO: 1/ 3       PTA Visit #: 0/5       Subjective     She was compliant with home exercise program.  Response to previous treatment: Decreased Headache  Functional change: Decreased Headache    Pain: 3/10  Location: bilateral neck      Objective      Objective Measures updated at progress report unless specified.     Treatment     Suzette received the treatments listed below:    Suzette received the treatments listed below:       therapeutic exercises to develop strength, endurance, ROM, flexibility, posture, and core stabilization for 30 minutes including:  Chin Tucks  Cervical Rotation  Seated CTJ Mob  Upper Trap Stretch  Levator Scap Stretch     manual therapy techniques for 30 minutes, including:  Grd IV/V Joint Mobs to CTJ, C4-C5, C1-C2  STM to Upper Trap  Grd III Joint Mobs to T3-T4    Patient Education and Home Exercises       Education provided:   - HEP    Written Home Exercises Provided: Yes. Exercises were reviewed and Suzette was able to demonstrate them prior to the end of the session.  Suzette demonstrated good  understanding of the education provided. See Electronic Medical Record under Patient Instructions for exercises provided during therapy sessions    Assessment     Pt tolerated treatment well and had no complaints of pain. Cont to progress POC as  annie [place] Is progressing well towards her goals.   Patient prognosis is Excellent.     Patient will continue to benefit from skilled outpatient physical therapy to address the deficits listed in the problem list box on initial evaluation, provide pt/family education and to maximize pt's level of independence in the home and community environment.     Patient's spiritual, cultural and educational needs considered and pt agreeable to plan of care and goals.     Anticipated barriers to physical therapy: None    Goals:   Short Term Goals (4 Weeks):   1. Pt will be independent with HEP to supplement PT in improving pain free cervical mobility  2. Pt will improve UE MMTs by 1/2 grade in all planes to improve strength for lifting and carrying tasks.  3. Pt will demonstrate improved sitting posture to decrease pain experienced in head and neck.  Long Term Goals (8 Weeks):   1. Pt will improve FOTO to >/=45 to improve perceived limitation with changing and maintaining mobility.  2. Pt will improve cervical AROM to WNL in all planes to improve cervical mobility for driving   3. Pt will improve UE MMTs 1 grade in all planes to improve strength for lifting and carrying tasks.  4. Pt will report no pain with lifting > 5 lbs to promote physical activity.   5. Pt will report no pain with cervical AROM in all planes to promote QOL.  Plan      Plan of care Certification: 9/17/2024 to 11/17/2024.     Outpatient Physical Therapy 2 times weekly for 2 weeks to include the following interventions: Aquatic Therapy, Cervical/Lumbar Traction, Gait Training, Manual Therapy, Moist Heat/ Ice, Neuromuscular Re-ed, Patient Education, Self Care, Therapeutic Activities, Therapeutic Exercise, and Ultrasound.      Anthony Holder, PT           Physician's Signature: _________________________________________ Date: ________________

## 2025-07-30 DIAGNOSIS — Z12.31 ENCOUNTER FOR SCREENING MAMMOGRAM FOR MALIGNANT NEOPLASM OF BREAST: Primary | ICD-10-CM

## 2025-08-07 DIAGNOSIS — R87.610 PAP SMEAR ABNORMALITY OF CERVIX WITH ASCUS FAVORING BENIGN: Primary | ICD-10-CM
